# Patient Record
Sex: FEMALE | Race: AMERICAN INDIAN OR ALASKA NATIVE | URBAN - METROPOLITAN AREA
[De-identification: names, ages, dates, MRNs, and addresses within clinical notes are randomized per-mention and may not be internally consistent; named-entity substitution may affect disease eponyms.]

---

## 2022-01-04 ENCOUNTER — OFFICE VISIT (OUTPATIENT)
Dept: URBAN - METROPOLITAN AREA CLINIC 19 | Facility: CLINIC | Age: 67
End: 2022-01-04

## 2022-01-06 ENCOUNTER — OFFICE VISIT (OUTPATIENT)
Dept: URBAN - METROPOLITAN AREA CLINIC 19 | Facility: CLINIC | Age: 67
End: 2022-01-06

## 2022-01-18 ENCOUNTER — WEB ENCOUNTER (OUTPATIENT)
Dept: URBAN - METROPOLITAN AREA CLINIC 19 | Facility: CLINIC | Age: 67
End: 2022-01-18

## 2022-01-18 ENCOUNTER — DASHBOARD ENCOUNTERS (OUTPATIENT)
Age: 67
End: 2022-01-18

## 2022-01-18 ENCOUNTER — TELEPHONE ENCOUNTER (OUTPATIENT)
Dept: URBAN - METROPOLITAN AREA CLINIC 19 | Facility: CLINIC | Age: 67
End: 2022-01-18

## 2022-01-18 ENCOUNTER — LAB OUTSIDE AN ENCOUNTER (OUTPATIENT)
Dept: URBAN - METROPOLITAN AREA CLINIC 19 | Facility: CLINIC | Age: 67
End: 2022-01-18

## 2022-01-18 ENCOUNTER — OFFICE VISIT (OUTPATIENT)
Dept: URBAN - METROPOLITAN AREA CLINIC 19 | Facility: CLINIC | Age: 67
End: 2022-01-18
Payer: MEDICARE

## 2022-01-18 VITALS
TEMPERATURE: 98.3 F | SYSTOLIC BLOOD PRESSURE: 122 MMHG | DIASTOLIC BLOOD PRESSURE: 76 MMHG | HEART RATE: 98 BPM | WEIGHT: 176 LBS | HEIGHT: 64 IN | BODY MASS INDEX: 30.05 KG/M2

## 2022-01-18 DIAGNOSIS — Z12.11 COLON CANCER SCREENING: ICD-10-CM

## 2022-01-18 PROBLEM — 305058001: Status: ACTIVE | Noted: 2022-01-18

## 2022-01-18 PROCEDURE — 99203 OFFICE O/P NEW LOW 30 MIN: CPT | Performed by: NURSE PRACTITIONER

## 2022-01-18 PROCEDURE — 993 AGA: Performed by: NURSE PRACTITIONER

## 2022-01-18 PROCEDURE — 99243 OFF/OP CNSLTJ NEW/EST LOW 30: CPT | Performed by: NURSE PRACTITIONER

## 2022-01-18 RX ORDER — PANTOPRAZOLE SODIUM 40 MG/1
1 TABLET TABLET, DELAYED RELEASE ORAL ONCE A DAY
Status: ACTIVE | COMMUNITY

## 2022-01-18 NOTE — HPI-TODAY'S VISIT:
The patient was referred by Dr. Sonja Cummings for esophageal adenocarcinoma .   A copy of this document is being forwarded to the referring provider.       Ms. Maria is a 66-year-old female who presents today for esophageal adenocarcinoma.  Her last EGD was about 5 years ago and that time she was told she had Chatman's esophagus and esophageal stricture.  Was supposed to have follow-up EGD that was not completed.  Around Thanksgiving she developed difficulty swallowing with nausea and vomiting.  CT ab/pelvis with contrast 12/15/2021- "hiatal hernia with suspected esophageal/gastric wall thickening.  Shotty periesophageal lymph nodes.  Recommend endoscopy for direct inspection." EGD with Dr. Cevallos 12/16/2021- "normal upper third of esophagus.  Partially obstructing, malignant esophageal tumor was found in the middle third of the esophagus and in the lower third of the esophagus.  Biopsied.  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  Normal gastric fundus, gastric body, antrum and prepyloric region of the stomach.  Normal examined duodenum.  Pathology revealed adenocarcinoma, moderately to poorly differentiated." She had postprocedural hemoptysis and was referred to [place] where a left chest port and PEG tube were placed.  She also had an esophageal stent placed which has since migrated into the stomach.  It has since been replaced per patient. Radiation tomorrow with and iron infusion afterward. Currently able to swallow and eat. Flushing PEG. She has never had a colonoscopy. Said she did cologuard 7 years ago and it was normal. She denies cardiac/kidney/lung disease, diabetes, blood thinners, and home O2.

## 2022-01-20 PROBLEM — 276803003: Status: ACTIVE | Noted: 2022-01-18

## 2022-02-09 ENCOUNTER — TELEPHONE ENCOUNTER (OUTPATIENT)
Dept: URBAN - METROPOLITAN AREA CLINIC 19 | Facility: CLINIC | Age: 67
End: 2022-02-09

## 2022-02-10 ENCOUNTER — OFFICE VISIT (OUTPATIENT)
Dept: URBAN - METROPOLITAN AREA MEDICAL CENTER 28 | Facility: MEDICAL CENTER | Age: 67
End: 2022-02-10